# Patient Record
(demographics unavailable — no encounter records)

---

## 2025-06-18 NOTE — CONSULT LETTER
[Dear  ___] : Dear  [unfilled], [Consult Letter:] : I had the pleasure of evaluating your patient, [unfilled]. [Please see my note below.] : Please see my note below. [Consult Closing:] : Thank you very much for allowing me to participate in the care of this patient.  If you have any questions, please do not hesitate to contact me. [Sincerely,] : Sincerely, [FreeTextEntry2] : Precious Massand [FreeTextEntry3] :  Beni Herring MD FACP FCCP Pulmonary Diseases and Critical Care Medicine. Chief, Pulmonary Diseases, [facility]

## 2025-06-18 NOTE — PHYSICAL EXAM
[No Acute Distress] : no acute distress [Normal Oropharynx] : normal oropharynx [Low Lying Soft Palate] : low lying soft palate [Normal Appearance] : normal appearance [Supple] : supple [Thyroid Not Enlarged] : thyroid not enlarged [No Neck Mass] : no neck mass [Normal Rate/Rhythm] : normal rate/rhythm [Normal Pulses] : normal pulses [Normal PMI] : normal pmi [Normal S1, S2] : normal s1, s2 [No Murmurs] : no murmurs [No Rubs] : no rubs [No Gallops] : no gallops [No Resp Distress] : no resp distress [No Acc Muscle Use] : no acc muscle use [Normal Palpation] : normal palpation [Clear to Auscultation Bilaterally] : clear to auscultation bilaterally [Normal to Percussion] : normal to percussion [No Abnormalities] : no abnormalities [Benign] : benign [No Clubbing] : no clubbing [No Cyanosis] : no cyanosis [No Edema] : no edema [Normal Color/ Pigmentation] : normal color/ pigmentation [Normal Affect] : normal affect [TextBox_2] : obese female [TextBox_105] : no calf tenderness

## 2025-06-18 NOTE — DISCUSSION/SUMMARY
[FreeTextEntry1] : Catherine has multiple pulmonary issues. Pft was normal. Her nodules do not look suspicious on PET scan and she is low risk for lung cancer.  She may have some residual scarring from COVID.  Advise repeat CT of chest.  She has sleep apnea and we will do a home sleep study.  She had a pulmonary embolism which was provoked however she is poorly ambulatory and is considering having a right hip replacement soon.  I would repeat venous Doppler of the legs and would consider continuing low-dose of Eliquis at least until she is fully ambulatory and has had` her hip replacement.  Advised Echocardiogram.  I will see her back here in 4 months.    Time spent reviewing file, taking history , and examining patient: ___61_______ minutes

## 2025-06-18 NOTE — REASON FOR VISIT
[Consultation] : a consultation [Abnormal CXR/ Chest CT] : an abnormal CXR/ chest CT [Sleep Apnea] : sleep apnea [Pulmonary Embolism] : pulmonary embolism [TextBox_44] : Pulmonary evaluation

## 2025-06-18 NOTE — HISTORY OF PRESENT ILLNESS
[Never] : never [TextBox_4] : 66-year-old -American female for evaluation of multiple pulmonary issues.  She had a pulmonary embolism following lumbar disc surgery last October at [facility] and was told that she had a spiculated nodule on CAT scan.  She subsequently had a PET scan in January 2025 which showed no dominant suspicious spiculated right upper lobe mass but did show a 4 mm lateral right upper lobe nodule and 7 mm density in the central mid right upper lobe as well as a 3 mm left upper lobe nodule.  She denies any shortness of breath chest pain cough or any other pulmonary symptoms at this time.  She denies prior venous thromboembolic embolic disease or any family history of VTE.  She denies smoking.  She did have COVID several years ago with a very prolonged recovery.  She has been on Eliquis since October 2024.  She does have a history of sleep apnea but has not been using CPAP.  She still snores and has daytime somnolence.

## 2025-07-12 NOTE — PHYSICAL EXAM
[Antalgic] : antalgic [Walker] : ambulates with walker [LE] : Sensory: Intact in bilateral lower extremities [DP] : dorsalis pedis 2+ and symmetric bilaterally [Normal RLE] : Right Lower Extremity: No scars, rashes, lesions, ulcers, skin intact [Normal Touch] : sensation intact for touch [Normal] : no peripheral adenopathy appreciated [de-identified] : On physical exam of the right hip skin is warm and dry without erythema ecchymosis signs or symptoms of infection superficial or deep. Patient had difficulty laying down on the examination table secondary to significant pain in the right hip. There is tenderness noted at the right groin.  Range of motion is 90 degrees of flexion, 15 degrees of external rotation and 10 degrees of internal rotation all with significant groin pain.  Strength is maintained in all planes.  Sensation is intact throughout the distal lower extremity.  There are 2+ dorsalis pedis pulse.  Leg lengths appear equal measured at the medial malleolus. Negative Robin's exam  [de-identified] : Two radiographs of the pelvis and right hip were performed today in the [place] office. KL grade 3 cartilaginous wear in the F- A joint with AVN noted in the femoral head.  Previous MRI from [facility] reviewed which showed labral tearing with AVN of the right and left femoral heads.

## 2025-07-12 NOTE — DISCUSSION/SUMMARY
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 Based upon the patients continued symptoms and failure to respond to 3 months of conservative treatment I have recommended a Right Total Hip Replacement for this patient. A long discussion took place with the patient describing what a total joint replacement is and what the procedure would entail. A Total Hip model, similar to the implant that will be used during the operation was utilized to demonstrate and to discuss the various bearing surfaces of the implants.   The benefits of surgery were discussed with the patient including the potential for improving their current clinical condition through operative intervention. Alternatives to surgical intervention including continued conservative management were also discussed in detail.   The hospitalization and post-operative care and rehabilitation were also discussed. The use of perioperative antibiotics and DVT prophylaxis were discussed. The risk, benefits and alternatives to a surgical intervention were discussed at length with the patient. The patient was also advised of risks related to the medical comorbidities and elevated body mass index (BMI). A lengthy discussion took place to review the most common complications including but not limited to: deep vein thrombosis, pulmonary embolus, heart attack, stroke, infection, wound breakdown, numbness, damage to nerves, tendon, muscles, arteries or other blood vessels, death and other possible complications from anesthesia. The patient was told that we will take steps to minimize these risks by using sterile technique, antibiotics and DVT prophylaxis when appropriate and follow the patient postoperatively in the office setting to monitor progress. The possibility of recurrent pain, no improvement in pain and actual worsening of pain were also discussed with the patient.   The nuances between posterior approach and anterior approach THR were reviewed in detail. Considering his clinical exam and X-Ray findings he does meet inclusion criteria for benefiting from ASI or Anterior Approach THR. The patient was advised that this is Dr. Walker's primary surgical approach for THR unless contraindicated by above. Specific ASI complications especially vascular injury, neurologic injury-lateral femoral cutaneous nerve, and proximal femur fracture were reviewed in detail.   The need for 4 weeks of Anterior THR dislocation precautions with activity and the risk of THR implant dislocation at any point Post-Op was reviewed in detail.   The patient was advised of the goals, alternatives, risks and benefits of a 3 week course of Celebrex 200 mg BID utilized by Dr. Walker in their practice to prevent Heterotopic Ossification seen in patients post total hip arthroplasty. If this is medically contraindicated the alternative would be a single dose (800cGy) radiation treatment to the hip implant site performed pre-operatively. A consultation with the Radiation Oncologist at [facility] will then be required.   The discharge plan of care focused on the patient going home following surgery. I encouraged the patient to make the necessary arrangements to have someone stay with them when they are discharged home. Following discharge, a home care nurse will visit the patient. They will open your home care case and request home physical therapy services. Home physical therapy   will commence following discharge provided it is appropriate and covered by their health insurance benefit plan.   All questions were answered to the satisfaction of the patient. The treatment plan of care, as well as a model of a total Hip equivalent to the one that will be used for their total joint replacement, was shared with the patient.   A DJO implant with Ceramic on Poly bearing surfaces is the implant I feel comfortable utilizing in my Primary THRs and the implant I am planning for their THR. If the patient wishes to utilize a different implant brand/type for their THR, they may obtain an additional surgical opinion from a Surgeon utilizing that brand implant.   The patient agreed to the plan of care as well as the use of implants in their total joint replacement.   The patient was advised that they will require a medical preoperative risk evaluation by their PCP. Further medical subspecialty clearances such as cardiac may be indicated if felt needed by their PCP.   The patient participated in an interactive discussion of the THR implant planned for their surgery with questions answered, agreed with the treatment plan, and has decided to move forward with elective THR as planned.

## 2025-07-12 NOTE — END OF VISIT
[FreeTextEntry3] : I, Dr. Walker, personally performed the evaluation and management (E/M) services for this new patient. That E/M includes conducting the clinically appropriate initial history &/or exam, assessing all conditions, and establishing the plan of care. Today, my KYM, Law Chan PA-C, was here to observe my evaluation and management service for this patient & follow plan of care established by me going forward.

## 2025-07-12 NOTE — HISTORY OF PRESENT ILLNESS
[de-identified] : 66-year-old female presents with right hip pain which has been ongoing since September.  She states that she thought it was from her low back after her spine surgery however she was evaluated by her spine doctor who said that it is coming from her hip.  She got 2 MRIs of bilateral hips which revealed avascular necrosis in both the right and left hips.  She presents here today for consult of total hip replacement.  Her pain is rated as a 6-7 out of 10.  She has significant groin pain.  Pain can radiate down to both knees as well.  She does have known osteoarthritis of the right knee as well.  For pain she is taking tramadol Tylenol as well as baclofen as prescribed by her pain management doctor.  She has significant difficulty with standing and ambulating secondary to pain.  She is ambulating with a walker.  Her spine doctor at the last appointment said that she will need a third to spine surgery as one of the plates has shifted however he would like her to have the hip replaced prior to pursuing her third spine surgery.  She did have a pulmonary embolism in October after the second spine surgery and is now currently on Eliquis.  She is ambulating with a walker secondary to pain and discomfort from both hips and low back. Denies fevers, chills, chest pain, calf pain, shortness of breath.  [Worsening] : worsening [7] : a current pain level of 7/10 [9] : a maximum pain level of 9/10 [Walking] : walking [Standing] : standing [Daily] : ~He/She~ states the symptoms seem to be occuring daily [Bending] : worsened by bending [Hip Movement] : worsened by hip movement [Rest] : relieved by rest
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
## 2025-07-29 NOTE — CONSULT LETTER
[Dear  ___] : Dear  [unfilled], [Consult Letter:] : I had the pleasure of evaluating your patient, [unfilled]. [Please see my note below.] : Please see my note below. [Consult Closing:] : Thank you very much for allowing me to participate in the care of this patient.  If you have any questions, please do not hesitate to contact me. [Sincerely,] : Sincerely, [FreeTextEntry2] : Precious Massand [FreeTextEntry3] :  Beni Herring MD FACP FCCP Pulmonary Diseases and Critical Care Medicine. Chief, Pulmonary Diseases, [facility]
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
## 2025-07-29 NOTE — PHYSICAL EXAM
[No Acute Distress] : no acute distress [Normal Oropharynx] : normal oropharynx [Normal Appearance] : normal appearance [Thyroid Not Enlarged] : thyroid not enlarged [No Neck Mass] : no neck mass [Normal Rate/Rhythm] : normal rate/rhythm [Normal S1, S2] : normal s1, s2 [No Murmurs] : no murmurs [No Resp Distress] : no resp distress [No Acc Muscle Use] : no acc muscle use [Normal Palpation] : normal palpation [Clear to Auscultation Bilaterally] : clear to auscultation bilaterally [Normal to Percussion] : normal to percussion [No Abnormalities] : no abnormalities [Benign] : benign [Not Tender] : not tender [No Clubbing] : no clubbing [No Cyanosis] : no cyanosis [No Edema] : no edema [Normal Color/ Pigmentation] : normal color/ pigmentation [Normal Affect] : normal affect [TextBox_2] : obese female [TextBox_105] : walks with walker